# Patient Record
Sex: MALE | Race: WHITE | URBAN - METROPOLITAN AREA
[De-identification: names, ages, dates, MRNs, and addresses within clinical notes are randomized per-mention and may not be internally consistent; named-entity substitution may affect disease eponyms.]

---

## 2020-05-18 ENCOUNTER — OFFICE VISIT (OUTPATIENT)
Dept: FAMILY MEDICINE CLINIC | Facility: CLINIC | Age: 20
End: 2020-05-18
Payer: COMMERCIAL

## 2020-05-18 ENCOUNTER — TELEPHONE (OUTPATIENT)
Dept: FAMILY MEDICINE CLINIC | Facility: CLINIC | Age: 20
End: 2020-05-18

## 2020-05-18 VITALS
HEIGHT: 73 IN | TEMPERATURE: 98 F | HEART RATE: 72 BPM | BODY MASS INDEX: 27.57 KG/M2 | SYSTOLIC BLOOD PRESSURE: 111 MMHG | DIASTOLIC BLOOD PRESSURE: 75 MMHG | WEIGHT: 208 LBS

## 2020-05-18 DIAGNOSIS — K64.8 INTERNAL HEMORRHOIDS: Primary | ICD-10-CM

## 2020-05-18 PROCEDURE — 99203 OFFICE O/P NEW LOW 30 MIN: CPT | Performed by: FAMILY MEDICINE

## 2020-05-18 RX ORDER — HYDROCORTISONE ACETATE PRAMOXINE HCL 2.5; 1 G/100G; G/100G
1 CREAM TOPICAL DAILY
Qty: 30 G | Refills: 1 | Status: SHIPPED | OUTPATIENT
Start: 2020-05-18

## 2020-05-18 NOTE — TELEPHONE ENCOUNTER
Reason for Call/Symptoms: pt returned call, states for the last one to two months he feels a pressure in his rectal area after having a bowel movement  Onset: symptoms started two months ago  Courtesy Assessment: pt states after he has a bowel movement he

## 2020-05-18 NOTE — TELEPHONE ENCOUNTER
Pt mother calling and states pt is having rectal pain and bleeding. Pt is new to clinic, no ANGELY on chart. Asked to speak with pt directly and per mother she is not with pt and will have him call back.

## 2020-05-18 NOTE — PROGRESS NOTES
5/18/2020  2:05 PM    Tonya Batista is a 23year old male. Chief complaint(s): Patient presents with:  Rectal Problem: pt c/o rectal pressure and sensation of something stuck in rectum for x about 2 months.     HPI:     Tonya Batista primary complaint Physical Exam    Constitutional: He appears well-developed and well-nourished. /75   Pulse 72   Temp 98.1 °F (36.7 °C) (Oral)   Ht 6' 1\" (1.854 m)   Wt 208 lb (94.3 kg)   BMI 27.44 kg/m²    HENT:   Head: Normocephalic.    Eyes: Conjunctivae are n

## 2020-11-18 ENCOUNTER — TELEPHONE (OUTPATIENT)
Dept: FAMILY MEDICINE CLINIC | Facility: CLINIC | Age: 20
End: 2020-11-18

## 2020-11-18 ENCOUNTER — TELEMEDICINE (OUTPATIENT)
Dept: INTERNAL MEDICINE CLINIC | Facility: CLINIC | Age: 20
End: 2020-11-18
Payer: COMMERCIAL

## 2020-11-18 DIAGNOSIS — F41.9 ANXIETY: ICD-10-CM

## 2020-11-18 DIAGNOSIS — R07.89 CHEST PRESSURE: Primary | ICD-10-CM

## 2020-11-18 DIAGNOSIS — R06.02 SHORTNESS OF BREATH: ICD-10-CM

## 2020-11-18 PROCEDURE — 99203 OFFICE O/P NEW LOW 30 MIN: CPT | Performed by: INTERNAL MEDICINE

## 2020-11-18 NOTE — TELEPHONE ENCOUNTER
Pt called back and he stated that he is not having chest pain he stated that it was more like feeling sob. This started Sunday night. Pt stated that he does do THC ( Like Faizan) but he has been doing this for sometime now.  So he does not think is due to Bekah Manning Dr. Antoine, Medicine NP

## 2020-11-18 NOTE — TELEPHONE ENCOUNTER
NO ANGELY on file. I cannot call patient's mother and answer any questions related to his health. If mother calls back, please inform her, patient will need to be on the phone and give permission to speak with her. Otherwise, he will need to sign ANGELY and add her name on form.

## 2020-11-18 NOTE — TELEPHONE ENCOUNTER
LMTCB If pt calls please transfer to ext [phone] Thank you     Annalise Bella I received a call from pt mother. Per mother pt complains of chest pain and then he starts to cry.  She has told him that she will take him to see the doctor of [facility] then pt states that

## 2020-11-18 NOTE — PROGRESS NOTES
Patient ID: Juan Zhao is a 21year old male. Patient presents with:  Shortness Of Breath  Chest Pressure         HISTORY OF PRESENT ILLNESS:   Patient presents for above.   This visit is conducted using Telemedicine with live, interactive video and au Needs      Financial resource strain: Not on file      Food insecurity        Worry: Not on file        Inability: Not on file      Transportation needs        Medical: Not on file        Non-medical: Not on file    Tobacco Use      Smoking status: Never S #1.    Return if symptoms worsen or fail to improve.     Time spent on encounter  16 minutes   Video time 11 minutes   Documentation time 5 minutes     Rena Tirado understands video evaluation is not a substitute for face-to-face examination or emergency MD  11/18/2020

## 2020-11-18 NOTE — TELEPHONE ENCOUNTER
Mother, would like to speak with the nurse. Mother states that the nurse was going to call her back after speaking with her son. Please, also see encounter for today.

## 2020-12-03 ENCOUNTER — TELEMEDICINE (OUTPATIENT)
Dept: FAMILY MEDICINE CLINIC | Facility: CLINIC | Age: 20
End: 2020-12-03
Payer: COMMERCIAL

## 2020-12-03 DIAGNOSIS — Z00.00 WELL ADULT EXAM: Primary | ICD-10-CM

## 2020-12-03 DIAGNOSIS — F41.9 ANXIETY: ICD-10-CM

## 2020-12-03 PROCEDURE — 99203 OFFICE O/P NEW LOW 30 MIN: CPT | Performed by: NURSE PRACTITIONER

## 2020-12-03 NOTE — PROGRESS NOTES
HPI  Pt here for nausea, vomiting, chills. Has nausea mostly in am.   Has slight low back pain and some occasional testicular pain. Used to smoke a lot of marijuana and quit cold turkey a couple of weeks ago. Just startedtherapy for anxiety.   Gets oc Single      Spouse name: Not on file      Number of children: Not on file      Years of education: Not on file      Highest education level: Not on file    Occupational History      Not on file    Social Needs      Financial resource strain: Not on file time. He appears distressed (anxious). Pulmonary/Chest: Effort normal. No respiratory distress. No coughing, audible wheezing, shortness of breath or difficulty talking in full sentences.       Neurological: He is alert and oriented to person, place, an [facility]'s notice of privacy practices, telehealth consent form and other related consent forms and documents. which are located on the [facility] website. The patient verbally agreed to telehealth consent form, related consents and the risks discussed.     Lastly, th

## 2020-12-03 NOTE — PATIENT INSTRUCTIONS
Understanding Anxiety Disorders  Almost everyone gets nervous now and then. It’s normal to have knots in your stomach before a test. Or for your heart to beat fast on a first date. But an anxiety disorder is much more than a case of nerves.  In fact, its You may believe that nothing can help you. Or, you might fear what others may think. But most anxiety symptoms can be eased. Having an anxiety disorder is nothing to be ashamed of.  Most people do best with treatment that combines medicine and individual an Anxiety can become a problem when it is hard to control, occurs for months, and interferes with important parts of your life. With an anxiety disorder, your body has the response described above, but in inappropriate ways.  The response a person has depends · Learn more about anxiety disorders. Keep track of helpful online resources and books you can use during stressful periods. · Try stress management. Try methods such as meditation. · Talk with others.  Think about joining online or in-person support melissa Certain medicines may be prescribed to help control your symptoms. So you may feel less anxious. You may also feel able to move forward with therapy. At first, medicines and dosages may need to be adjusted to find what works best for you.  Try to be patient · Medicines are often taken for 6 to 12 months. Your healthcare provider will then evaluate whether you need to stay on them. Many people who have also had therapy may no longer need medicine to manage anxiety.   · You may need to stop taking medicine slowl Treating Anxiety Disorders with Therapy    If you have an anxiety disorder, you don’t have to suffer needlessly. Treatment is available. Therapy (also called counseling) is often a helpful treatment for anxiety disorders.  With therapy, a trained profession Therapy will help you feel better and teach you skills to help manage anxiety long term. But change doesn’t happen right away. It takes a commitment from you. And treatment only works if you learn to face the causes of your anxiety.  So, you might feel wors © 1249-3201 [employer] [address] [facility], [facility] [place]. All rights reserved. This information is not intended as a substitute for professional medical care. Always follow your healthcare professional's instructions.

## 2020-12-03 NOTE — ASSESSMENT & PLAN NOTE
Advised to make appt for physical-labs ordered  Discussed starting mediation for anxiety and pt is apprehensive  Enc to discuss meds with therapist-has appt in 2 days  Enc f/u within next week. Please call if symptoms worsen or are not resolving.

## 2020-12-04 ENCOUNTER — LAB ENCOUNTER (OUTPATIENT)
Dept: LAB | Age: 20
End: 2020-12-04
Attending: NURSE PRACTITIONER
Payer: COMMERCIAL

## 2020-12-04 DIAGNOSIS — Z00.00 WELL ADULT EXAM: ICD-10-CM

## 2020-12-04 PROCEDURE — 83735 ASSAY OF MAGNESIUM: CPT

## 2020-12-04 PROCEDURE — 36415 COLL VENOUS BLD VENIPUNCTURE: CPT

## 2020-12-04 PROCEDURE — 80053 COMPREHEN METABOLIC PANEL: CPT

## 2020-12-04 PROCEDURE — 80061 LIPID PANEL: CPT

## 2020-12-04 PROCEDURE — 81001 URINALYSIS AUTO W/SCOPE: CPT | Performed by: NURSE PRACTITIONER

## 2020-12-04 PROCEDURE — 82607 VITAMIN B-12: CPT

## 2020-12-04 PROCEDURE — 82306 VITAMIN D 25 HYDROXY: CPT

## 2020-12-04 PROCEDURE — 85027 COMPLETE CBC AUTOMATED: CPT

## 2020-12-04 PROCEDURE — 84443 ASSAY THYROID STIM HORMONE: CPT

## 2020-12-05 ENCOUNTER — PATIENT MESSAGE (OUTPATIENT)
Dept: FAMILY MEDICINE CLINIC | Facility: CLINIC | Age: 20
End: 2020-12-05

## 2020-12-06 ENCOUNTER — TELEPHONE (OUTPATIENT)
Dept: FAMILY MEDICINE CLINIC | Facility: CLINIC | Age: 20
End: 2020-12-06

## 2020-12-06 NOTE — TELEPHONE ENCOUNTER
RN pls call pt and triage or offer ov if needed, thanks.        From: Jen Barcenas  To: Sha Decker, APRN  Sent: 12/5/2020 10:17 PM CST  Subject: Non-Urgent Medical Question    Hi I was just concerned about something, I haven't been able to poop rig

## 2020-12-06 NOTE — TELEPHONE ENCOUNTER
From: Juan Zhao  To: SUE Navarro  Sent: 12/5/2020 10:17 PM CST  Subject: Non-Urgent Medical Question    Hi I was just concerned about something, I haven't been able to poop right and my back has been in pain I was just wondering is there a

## 2020-12-10 DIAGNOSIS — D58.2 ELEVATED HEMOGLOBIN (HCC): Primary | ICD-10-CM

## 2020-12-10 DIAGNOSIS — E83.52 SERUM CALCIUM ELEVATED: ICD-10-CM

## 2021-05-22 ENCOUNTER — IMMUNIZATION (OUTPATIENT)
Dept: LAB | Facility: HOSPITAL | Age: 21
End: 2021-05-22
Attending: EMERGENCY MEDICINE
Payer: COMMERCIAL

## 2021-05-22 DIAGNOSIS — Z23 NEED FOR VACCINATION: Primary | ICD-10-CM

## 2021-05-22 PROCEDURE — 0001A SARSCOV2 VAC 30MCG/0.3ML IM: CPT

## 2021-06-12 ENCOUNTER — IMMUNIZATION (OUTPATIENT)
Dept: LAB | Facility: HOSPITAL | Age: 21
End: 2021-06-12
Attending: EMERGENCY MEDICINE
Payer: COMMERCIAL

## 2021-06-12 DIAGNOSIS — Z23 NEED FOR VACCINATION: Primary | ICD-10-CM

## 2021-06-12 PROCEDURE — 0002A SARSCOV2 VAC 30MCG/0.3ML IM: CPT

## 2022-03-11 ENCOUNTER — NURSE TRIAGE (OUTPATIENT)
Dept: FAMILY MEDICINE CLINIC | Facility: CLINIC | Age: 22
End: 2022-03-11

## 2022-03-11 ENCOUNTER — TELEMEDICINE (OUTPATIENT)
Dept: FAMILY MEDICINE CLINIC | Facility: CLINIC | Age: 22
End: 2022-03-11

## 2022-03-11 DIAGNOSIS — J30.2 SEASONAL ALLERGIC RHINITIS, UNSPECIFIED TRIGGER: Primary | ICD-10-CM

## 2022-03-11 PROCEDURE — 99213 OFFICE O/P EST LOW 20 MIN: CPT | Performed by: NURSE PRACTITIONER

## 2022-03-11 RX ORDER — FLUTICASONE PROPIONATE 50 MCG
2 SPRAY, SUSPENSION (ML) NASAL DAILY
Qty: 3 EACH | Refills: 1 | Status: SHIPPED | OUTPATIENT
Start: 2022-03-11 | End: 2023-03-06

## 2022-05-02 NOTE — ASSESSMENT & PLAN NOTE
-otc non-drowsy antihistamine (generic claritin, zyrtec or allegra)  -add steroidal nasal spray (flonase, rhinocort -generic works well)    -supportive care discussed  -Please call if symptoms worsen or are not resolving.
Patient denies suicidal ideations. Responsibility to family and others, Identifies reasons for living, Has future plans, Engaged in work

## 2022-11-16 ENCOUNTER — TELEMEDICINE (OUTPATIENT)
Dept: FAMILY MEDICINE CLINIC | Facility: CLINIC | Age: 22
End: 2022-11-16

## 2022-11-16 DIAGNOSIS — R05.1 ACUTE COUGH: Primary | ICD-10-CM

## 2022-11-16 DIAGNOSIS — R06.02 SHORTNESS OF BREATH: ICD-10-CM

## 2022-11-16 DIAGNOSIS — R52 BODY ACHES: ICD-10-CM

## 2022-11-16 DIAGNOSIS — Z72.89 ENGAGES IN VAPING: ICD-10-CM

## 2022-11-16 PROCEDURE — 99214 OFFICE O/P EST MOD 30 MIN: CPT | Performed by: FAMILY MEDICINE

## 2022-11-16 RX ORDER — ALBUTEROL SULFATE 90 UG/1
2 AEROSOL, METERED RESPIRATORY (INHALATION) EVERY 6 HOURS PRN
Qty: 1 EACH | Refills: 0 | Status: SHIPPED | OUTPATIENT
Start: 2022-11-16

## 2024-09-16 ENCOUNTER — OFFICE VISIT (OUTPATIENT)
Dept: FAMILY MEDICINE CLINIC | Facility: CLINIC | Age: 24
End: 2024-09-16
Payer: COMMERCIAL

## 2024-09-16 ENCOUNTER — LAB ENCOUNTER (OUTPATIENT)
Dept: LAB | Age: 24
End: 2024-09-16
Attending: FAMILY MEDICINE
Payer: COMMERCIAL

## 2024-09-16 VITALS
OXYGEN SATURATION: 99 % | BODY MASS INDEX: 25.84 KG/M2 | HEART RATE: 74 BPM | HEIGHT: 73 IN | SYSTOLIC BLOOD PRESSURE: 147 MMHG | DIASTOLIC BLOOD PRESSURE: 72 MMHG | WEIGHT: 195 LBS

## 2024-09-16 DIAGNOSIS — L73.9 FOLLICULITIS: ICD-10-CM

## 2024-09-16 DIAGNOSIS — R10.31 INGUINAL PAIN OF BOTH SIDES: ICD-10-CM

## 2024-09-16 DIAGNOSIS — R30.0 DYSURIA: ICD-10-CM

## 2024-09-16 DIAGNOSIS — R10.32 INGUINAL PAIN OF BOTH SIDES: ICD-10-CM

## 2024-09-16 DIAGNOSIS — R30.0 DYSURIA: Primary | ICD-10-CM

## 2024-09-16 PROBLEM — Z00.00 WELL ADULT EXAM: Status: RESOLVED | Noted: 2020-12-03 | Resolved: 2024-09-16

## 2024-09-16 LAB
BILIRUB UR QL: NEGATIVE
CLARITY UR: CLEAR
COLOR UR: COLORLESS
GLUCOSE UR-MCNC: NORMAL MG/DL
HGB UR QL STRIP.AUTO: NEGATIVE
KETONES UR-MCNC: NEGATIVE MG/DL
LEUKOCYTE ESTERASE UR QL STRIP.AUTO: NEGATIVE
NITRITE UR QL STRIP.AUTO: NEGATIVE
PH UR: 7 [PH] (ref 5–8)
PROT UR-MCNC: NEGATIVE MG/DL
SP GR UR STRIP: 1.01 (ref 1–1.03)
T PALLIDUM AB SER QL IA: NONREACTIVE
UROBILINOGEN UR STRIP-ACNC: NORMAL

## 2024-09-16 PROCEDURE — 86780 TREPONEMA PALLIDUM: CPT

## 2024-09-16 PROCEDURE — 87491 CHLMYD TRACH DNA AMP PROBE: CPT

## 2024-09-16 PROCEDURE — 87591 N.GONORRHOEAE DNA AMP PROB: CPT

## 2024-09-16 PROCEDURE — 81003 URINALYSIS AUTO W/O SCOPE: CPT | Performed by: FAMILY MEDICINE

## 2024-09-16 PROCEDURE — 86695 HERPES SIMPLEX TYPE 1 TEST: CPT

## 2024-09-16 PROCEDURE — 36415 COLL VENOUS BLD VENIPUNCTURE: CPT

## 2024-09-16 PROCEDURE — 87389 HIV-1 AG W/HIV-1&-2 AB AG IA: CPT

## 2024-09-16 PROCEDURE — 99214 OFFICE O/P EST MOD 30 MIN: CPT | Performed by: FAMILY MEDICINE

## 2024-09-16 PROCEDURE — 86696 HERPES SIMPLEX TYPE 2 TEST: CPT

## 2024-09-16 NOTE — PROGRESS NOTES
Subjective:   Leonid Tony is a 24 year old male who presents for Sneezing (Discomfort in groin when sneezing or coughing for a couple months -no pain at the moment )     History/Other:    Chief Complaint Reviewed and Verified  Nursing Notes Reviewed and   Verified  Tobacco Reviewed  Allergies Reviewed  Medications Reviewed    Problem List Reviewed  Medical History Reviewed  Surgical History   Reviewed  Family History Reviewed  Social History Reviewed         Tobacco:  He has never smoked tobacco.    Current Outpatient Medications   Medication Sig Dispense Refill    albuterol 108 (90 Base) MCG/ACT Inhalation Aero Soln Inhale 2 puffs into the lungs every 6 (six) hours as needed for Wheezing or Shortness of Breath. (Patient not taking: Reported on 9/16/2024) 1 each 0    Hydrocort-Pramoxine, Perianal, (ANALPRAM HC) 2.5-1 % External Cream Apply 1 Application topically daily. (Patient not taking: Reported on 9/16/2024) 30 g 1    Psyllium 58.6 % Oral Powder Take 2 tablespoons in 6 oz of water at nightly. (Patient not taking: Reported on 9/16/2024) 660 g 7         Review of Systems:  Review of Systems   Constitutional: Negative.    Genitourinary:  Positive for testicular pain. Negative for decreased urine volume, penile pain and urgency.   Skin:  Positive for rash.         Objective:   /72   Pulse 74   Ht 6' 1\" (1.854 m)   Wt 195 lb (88.5 kg)   SpO2 99%   BMI 25.73 kg/m²  Estimated body mass index is 25.73 kg/m² as calculated from the following:    Height as of this encounter: 6' 1\" (1.854 m).    Weight as of this encounter: 195 lb (88.5 kg).  Physical Exam  Vitals reviewed.   Abdominal:      Hernia: There is no hernia in the left inguinal area or right inguinal area.   Genitourinary:     Penis: Uncircumcised.       Testes: Normal.      Epididymis:      Right: Normal.      Left: Normal.          Comments: Three smooth, elevated papules base of penis shaft , 1-2 mm size.  Other areas of red small  scabbed in pubic hair areas.   Lymphadenopathy:      Lower Body: No right inguinal adenopathy. No left inguinal adenopathy.           Assessment & Plan:   1. Dysuria (Primary)  -     Chlamydia/Gc Amplification; Future; Expected date: 09/16/2024  -     HSV 1 & 2 Glycoprotein Specific AB,IGG; Future; Expected date: 09/16/2024  -     HIV Ag/Ab Combo; Future; Expected date: 09/16/2024  -     T Pallidum Screening Cascade; Future; Expected date: 09/16/2024  -     Urinalysis with Culture Reflex  2. Inguinal pain of both sides  -     Chlamydia/Gc Amplification; Future; Expected date: 09/16/2024  -     HSV 1 & 2 Glycoprotein Specific AB,IGG; Future; Expected date: 09/16/2024  -     HIV Ag/Ab Combo; Future; Expected date: 09/16/2024  -     T Pallidum Screening Cascade; Future; Expected date: 09/16/2024  3. Folliculitis    STI screening, possible HPV.  May need to see derm also.        No follow-ups on file.    Doc Byrd DO, 9/16/2024, 11:06 AM

## 2024-09-17 ENCOUNTER — TELEPHONE (OUTPATIENT)
Dept: FAMILY MEDICINE CLINIC | Facility: CLINIC | Age: 24
End: 2024-09-17

## 2024-09-17 LAB
C TRACH DNA SPEC QL NAA+PROBE: POSITIVE
N GONORRHOEA DNA SPEC QL NAA+PROBE: NEGATIVE

## 2024-09-17 NOTE — TELEPHONE ENCOUNTER
[facility] called, verified patient's Name and . Patient is positive for Chlamydia. Physician notified per protocol.     Dr. Byrd please advise.

## 2024-09-18 LAB
HSV 1 GLYCOPROTEIN G, IGG: NEGATIVE
HSV 2 GLYCOPROTEIN G, IGG: NEGATIVE

## 2024-09-19 RX ORDER — AZITHROMYCIN 500 MG/1
1000 TABLET, FILM COATED ORAL DAILY
Qty: 2 TABLET | Refills: 0 | Status: SHIPPED | OUTPATIENT
Start: 2024-09-19

## 2024-09-27 ENCOUNTER — TELEPHONE (OUTPATIENT)
Dept: FAMILY MEDICINE CLINIC | Facility: CLINIC | Age: 24
End: 2024-09-27

## 2024-09-27 NOTE — TELEPHONE ENCOUNTER
Received documents from [facility] and placed in yellow folder. Thank you

## 2024-09-28 ENCOUNTER — MED REC SCAN ONLY (OUTPATIENT)
Dept: FAMILY MEDICINE CLINIC | Facility: CLINIC | Age: 24
End: 2024-09-28

## 2024-12-16 ENCOUNTER — HOSPITAL ENCOUNTER (EMERGENCY)
Facility: HOSPITAL | Age: 24
Discharge: HOME | End: 2024-12-16
Attending: EMERGENCY MEDICINE
Payer: COMMERCIAL

## 2024-12-16 ENCOUNTER — APPOINTMENT (OUTPATIENT)
Dept: CT IMAGING | Facility: HOSPITAL | Age: 24
End: 2024-12-16
Attending: EMERGENCY MEDICINE
Payer: COMMERCIAL

## 2024-12-16 VITALS
WEIGHT: 205 LBS | HEIGHT: 73 IN | SYSTOLIC BLOOD PRESSURE: 139 MMHG | RESPIRATION RATE: 20 BRPM | BODY MASS INDEX: 27.17 KG/M2 | TEMPERATURE: 97 F | OXYGEN SATURATION: 100 % | HEART RATE: 87 BPM | DIASTOLIC BLOOD PRESSURE: 83 MMHG

## 2024-12-16 DIAGNOSIS — R11.10 INTRACTABLE VOMITING: Primary | ICD-10-CM

## 2024-12-16 LAB
ALBUMIN SERPL-MCNC: 5.3 G/DL (ref 3.2–4.8)
ALP LIVER SERPL-CCNC: 66 U/L
ALT SERPL-CCNC: 14 U/L
ANION GAP SERPL CALC-SCNC: 10 MMOL/L (ref 0–18)
AST SERPL-CCNC: 23 U/L (ref ?–34)
BASOPHILS # BLD AUTO: 0.02 X10(3) UL (ref 0–0.2)
BASOPHILS NFR BLD AUTO: 0.1 %
BILIRUB DIRECT SERPL-MCNC: 0.6 MG/DL (ref ?–0.3)
BILIRUB SERPL-MCNC: 2.5 MG/DL (ref 0.3–1.2)
BUN BLD-MCNC: 16 MG/DL (ref 9–23)
BUN/CREAT SERPL: 13.9 (ref 10–20)
CALCIUM BLD-MCNC: 10.4 MG/DL (ref 8.7–10.4)
CHLORIDE SERPL-SCNC: 104 MMOL/L (ref 98–112)
CO2 SERPL-SCNC: 27 MMOL/L (ref 21–32)
CREAT BLD-MCNC: 1.15 MG/DL
DEPRECATED RDW RBC AUTO: 36.7 FL (ref 35.1–46.3)
EGFRCR SERPLBLD CKD-EPI 2021: 91 ML/MIN/1.73M2 (ref 60–?)
EOSINOPHIL # BLD AUTO: 0.02 X10(3) UL (ref 0–0.7)
EOSINOPHIL NFR BLD AUTO: 0.1 %
ERYTHROCYTE [DISTWIDTH] IN BLOOD BY AUTOMATED COUNT: 11.9 % (ref 11–15)
GLUCOSE BLD-MCNC: 146 MG/DL (ref 70–99)
HCT VFR BLD AUTO: 49.3 %
HGB BLD-MCNC: 18.2 G/DL
IMM GRANULOCYTES # BLD AUTO: 0.05 X10(3) UL (ref 0–1)
IMM GRANULOCYTES NFR BLD: 0.4 %
LIPASE SERPL-CCNC: 29 U/L (ref 12–53)
LYMPHOCYTES # BLD AUTO: 0.74 X10(3) UL (ref 1–4)
LYMPHOCYTES NFR BLD AUTO: 5.2 %
MCH RBC QN AUTO: 31.3 PG (ref 26–34)
MCHC RBC AUTO-ENTMCNC: 36.9 G/DL (ref 31–37)
MCV RBC AUTO: 84.9 FL
MONOCYTES # BLD AUTO: 0.56 X10(3) UL (ref 0.1–1)
MONOCYTES NFR BLD AUTO: 3.9 %
NEUTROPHILS # BLD AUTO: 12.79 X10 (3) UL (ref 1.5–7.7)
NEUTROPHILS # BLD AUTO: 12.79 X10(3) UL (ref 1.5–7.7)
NEUTROPHILS NFR BLD AUTO: 90.3 %
OSMOLALITY SERPL CALC.SUM OF ELEC: 296 MOSM/KG (ref 275–295)
PLATELET # BLD AUTO: 202 10(3)UL (ref 150–450)
POTASSIUM SERPL-SCNC: 3.6 MMOL/L (ref 3.5–5.1)
PROT SERPL-MCNC: 8.2 G/DL (ref 5.7–8.2)
RBC # BLD AUTO: 5.81 X10(6)UL
SODIUM SERPL-SCNC: 141 MMOL/L (ref 136–145)
WBC # BLD AUTO: 14.2 X10(3) UL (ref 4–11)

## 2024-12-16 PROCEDURE — 99285 EMERGENCY DEPT VISIT HI MDM: CPT

## 2024-12-16 PROCEDURE — 96375 TX/PRO/DX INJ NEW DRUG ADDON: CPT

## 2024-12-16 PROCEDURE — S0028 INJECTION, FAMOTIDINE, 20 MG: HCPCS | Performed by: EMERGENCY MEDICINE

## 2024-12-16 PROCEDURE — 99284 EMERGENCY DEPT VISIT MOD MDM: CPT

## 2024-12-16 PROCEDURE — 96374 THER/PROPH/DIAG INJ IV PUSH: CPT

## 2024-12-16 PROCEDURE — 83690 ASSAY OF LIPASE: CPT | Performed by: EMERGENCY MEDICINE

## 2024-12-16 PROCEDURE — 80076 HEPATIC FUNCTION PANEL: CPT | Performed by: EMERGENCY MEDICINE

## 2024-12-16 PROCEDURE — 74177 CT ABD & PELVIS W/CONTRAST: CPT | Performed by: EMERGENCY MEDICINE

## 2024-12-16 PROCEDURE — 80048 BASIC METABOLIC PNL TOTAL CA: CPT | Performed by: EMERGENCY MEDICINE

## 2024-12-16 PROCEDURE — 96361 HYDRATE IV INFUSION ADD-ON: CPT

## 2024-12-16 PROCEDURE — 85025 COMPLETE CBC W/AUTO DIFF WBC: CPT | Performed by: EMERGENCY MEDICINE

## 2024-12-16 RX ORDER — HALOPERIDOL 5 MG/ML
5 INJECTION INTRAMUSCULAR ONCE
Status: COMPLETED | OUTPATIENT
Start: 2024-12-16 | End: 2024-12-16

## 2024-12-16 RX ORDER — MORPHINE SULFATE 4 MG/ML
4 INJECTION, SOLUTION INTRAMUSCULAR; INTRAVENOUS ONCE
Status: COMPLETED | OUTPATIENT
Start: 2024-12-16 | End: 2024-12-16

## 2024-12-16 RX ORDER — METOCLOPRAMIDE HYDROCHLORIDE 5 MG/ML
10 INJECTION INTRAMUSCULAR; INTRAVENOUS ONCE
Status: COMPLETED | OUTPATIENT
Start: 2024-12-16 | End: 2024-12-16

## 2024-12-16 RX ORDER — FAMOTIDINE 10 MG/ML
20 INJECTION, SOLUTION INTRAVENOUS ONCE
Status: COMPLETED | OUTPATIENT
Start: 2024-12-16 | End: 2024-12-16

## 2024-12-16 RX ORDER — DIPHENHYDRAMINE HYDROCHLORIDE 50 MG/ML
25 INJECTION INTRAMUSCULAR; INTRAVENOUS ONCE
Status: COMPLETED | OUTPATIENT
Start: 2024-12-16 | End: 2024-12-16

## 2024-12-16 RX ORDER — ONDANSETRON 2 MG/ML
4 INJECTION INTRAMUSCULAR; INTRAVENOUS ONCE
Status: COMPLETED | OUTPATIENT
Start: 2024-12-16 | End: 2024-12-16

## 2024-12-16 NOTE — ED PROVIDER NOTES
Patient Seen in: [facility] Emergency Department      History     Chief Complaint   Patient presents with    Abdomen/Flank Pain    Nausea/Vomiting/Diarrhea     Stated Complaint: NVD, body aches, chills    Subjective:   HPI      Patient is a 24-year-old male who presents with left upper quadrant abdominal pain that started earlier this afternoon.  Positive vomiting and diarrhea.  No fevers.  No sick contacts or recent travel.  Tried [place] and Pepto-Bismol but continues to vomit.  He does use marijuana.  No alcohol.    Objective:     No pertinent past medical history.            No pertinent past surgical history.              No pertinent social history.                Physical Exam     ED Triage Vitals [12/16/24 0121]   /80   Pulse 102   Resp 20   Temp 97.4 °F (36.3 °C)   Temp src Oral   SpO2 100 %   O2 Device None (Room air)       Current Vitals:   Vital Signs  BP: 139/83  Pulse: 87  Resp: 20  Temp: 97.4 °F (36.3 °C)  Temp src: Oral  MAP (mmHg): 100    Oxygen Therapy  SpO2: 100 %  O2 Device: None (Room air)        Physical Exam  GENERAL: moderate distress, awake and alert, actively vomiting/retching  HEENT: EOMI, PERRL, MMM  Neck: supple  CV: RRR, no murmurs  Resp: CTAB, no wheezes or retractions  Ab: soft, TTP in LUQ with guarding. No rebound or masses, no cvat  Extremities: FROM of all extremities  Neuro: CN intact, normal speech, normal gait, 5/5 motor strength in all extremities, no focal deficits  SKIN: warm, dry, no rashes      ED Course     Labs Reviewed   BASIC METABOLIC PANEL (8) - Abnormal; Notable for the following components:       Result Value    Glucose 146 (*)     Calculated Osmolality 296 (*)     All other components within normal limits   HEPATIC FUNCTION PANEL (7) - Abnormal; Notable for the following components:    Bilirubin, Total 2.5 (*)     Bilirubin, Direct 0.6 (*)     Albumin 5.3 (*)     All other components within normal limits   CBC WITH DIFFERENTIAL WITH PLATELET -  Abnormal; Notable for the following components:    WBC 14.2 (*)     RBC 5.81 (*)     HGB 18.2 (*)     Neutrophil Absolute Prelim 12.79 (*)     Neutrophil Absolute 12.79 (*)     Lymphocyte Absolute 0.74 (*)     All other components within normal limits   LIPASE - Normal        MDM          Medical Decision Making  Ddx: cyclical vomiting, gastroparesis, PUD, GERD  Pt given zofran/ivf/morphine/pepcid/reglan    Pt with persistent nausea although improved. Will give haldol. Placed in observational stay. 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     Addendum: prior to admission, pt states the haldol helped his symptoms and he wants to go home.     Amount and/or Complexity of Data Reviewed  Labs: ordered.  Radiology: ordered.     Details: CT ABDOMEN AND PELVIS WITH CONTRAST    COMPARISON: None.    IMPRESSION:  No acute abnormality in the abdomen or pelvis.    No appendicitis.  No diverticulitis.  Mildly distended gallbladder.  No cholelithiasis, gallbladder wall thickening or pericholecystic fluid seen.  Correlate with fasting state.  No obstructive urolithiasis.  No SBO.    Scattered diffuse fluid within the bowel, suggestive of diarrheal state.    Report finalized on 12/16/24 at 4:23 AM ET.      Dr. Allie Godinez MD    Discussion of management or test interpretation with external provider(s): D/w dr Roberson    Risk  Parenteral controlled substances.  Decision regarding hospitalization.        Disposition and Plan     Clinical Impression:  1. Intractable vomiting         Disposition:  Discharge  12/16/2024  4:48 am    Follow-up:  Khris Tsang MD  [address] [place] [address]  [phone]    Follow up            Medications Prescribed:  Current Discharge Medication List              Supplementary Documentation:

## 2024-12-16 NOTE — ED INITIAL ASSESSMENT (HPI)
Pt arrives ambulatory to ED for c/o body aches/chills, NVD, and abd pain. Pt states symptoms since this afternoon. Pt states pepto bismol and erinn seltzer at home without relief. Aox4, speaking in full sentences.

## 2024-12-16 NOTE — ED QUICK NOTES
Approached by pt's family member stating pt's symptoms have resolved since being medicated (see MAR) and would like to discharged home.  Dr. Diaz notified and aware  Per Dr. Diaz pt to be discharged home.

## 2024-12-16 NOTE — ED QUICK NOTES
Orders for admission, patient is aware of plan and ready to go upstairs. Any questions, please call ED NANI Smith at extension [phone].     Patient Covid vaccination status: Fully vaccinated     COVID Test Ordered in ED: None    COVID Suspicion at Admission: N/A    Running Infusions:      Mental Status/LOC at time of transport: a&ox4    Other pertinent information:   CIWA score: N/A   NIH score:  N/A

## 2025-06-16 ENCOUNTER — OFFICE VISIT (OUTPATIENT)
Dept: OCCUPATIONAL MEDICINE | Age: 25
End: 2025-06-16
Attending: NURSE PRACTITIONER

## 2025-06-16 DIAGNOSIS — Z00.00 ROUTINE GENERAL MEDICAL EXAMINATION AT A HEALTH CARE FACILITY: Primary | ICD-10-CM

## 2025-06-16 PROCEDURE — 86480 TB TEST CELL IMMUN MEASURE: CPT

## 2025-06-18 LAB
M TB IFN-G CD4+ T-CELLS BLD-ACNC: 0.03 IU/ML
M TB TUBERC IFN-G BLD QL: NEGATIVE
M TB TUBERC IGNF/MITOGEN IGNF CONTROL: 9.35 IU/ML
QFT TB1 AG MINUS NIL: 0 IU/ML
QFT TB2 AG MINUS NIL: 0 IU/ML

## (undated) NOTE — LETTER
11/16/2022          To Whom It May Concern:    Eda Cervantes is currently under my medical care and may not return to work at this time. He may return to work on Friday, 11/18/2022. Activity is restricted as follows: none. If you require additional information please contact our office. Sincerely,          Shakeel Hussein MD          Document generated by:  Alba Ahumada Dorla Bay, MD

## (undated) NOTE — LETTER
3/11/2022          To Whom It May Concern:    Blanca Timmons is currently under my medical care. Please excuse Shonda Granados for 1 days. He may return to work on 3/12/2022. Activity is restricted as follows: no prolonged standing and none. If you require additional information please contact our office.         Sincerely,      SUE Harmon          Document generated by:  SUE Harmon